# Patient Record
Sex: MALE | Race: OTHER | HISPANIC OR LATINO | URBAN - METROPOLITAN AREA
[De-identification: names, ages, dates, MRNs, and addresses within clinical notes are randomized per-mention and may not be internally consistent; named-entity substitution may affect disease eponyms.]

---

## 2020-03-26 ENCOUNTER — INPATIENT (INPATIENT)
Facility: HOSPITAL | Age: 55
LOS: 0 days | Discharge: HOME | DRG: 195 | End: 2020-03-27
Attending: INTERNAL MEDICINE | Admitting: INTERNAL MEDICINE
Payer: COMMERCIAL

## 2020-03-26 VITALS
HEIGHT: 70 IN | WEIGHT: 240.08 LBS | OXYGEN SATURATION: 96 % | TEMPERATURE: 98 F | HEART RATE: 77 BPM | DIASTOLIC BLOOD PRESSURE: 86 MMHG | SYSTOLIC BLOOD PRESSURE: 147 MMHG | RESPIRATION RATE: 20 BRPM

## 2020-03-26 DIAGNOSIS — R07.9 CHEST PAIN, UNSPECIFIED: ICD-10-CM

## 2020-03-26 LAB
ALBUMIN SERPL ELPH-MCNC: 3.5 G/DL — SIGNIFICANT CHANGE UP (ref 3.5–5)
ALP SERPL-CCNC: 119 U/L — SIGNIFICANT CHANGE UP (ref 40–120)
ALT FLD-CCNC: 92 U/L DA — HIGH (ref 10–60)
ANION GAP SERPL CALC-SCNC: 6 MMOL/L — SIGNIFICANT CHANGE UP (ref 5–17)
AST SERPL-CCNC: 52 U/L — HIGH (ref 10–40)
BASOPHILS # BLD AUTO: 0 K/UL — SIGNIFICANT CHANGE UP (ref 0–0.2)
BASOPHILS NFR BLD AUTO: 0 % — SIGNIFICANT CHANGE UP (ref 0–2)
BILIRUB SERPL-MCNC: 0.6 MG/DL — SIGNIFICANT CHANGE UP (ref 0.2–1.2)
BUN SERPL-MCNC: 12 MG/DL — SIGNIFICANT CHANGE UP (ref 7–18)
CALCIUM SERPL-MCNC: 8 MG/DL — LOW (ref 8.4–10.5)
CHLORIDE SERPL-SCNC: 108 MMOL/L — SIGNIFICANT CHANGE UP (ref 96–108)
CO2 SERPL-SCNC: 25 MMOL/L — SIGNIFICANT CHANGE UP (ref 22–31)
CREAT SERPL-MCNC: 1.05 MG/DL — SIGNIFICANT CHANGE UP (ref 0.5–1.3)
EOSINOPHIL # BLD AUTO: 0 K/UL — SIGNIFICANT CHANGE UP (ref 0–0.5)
EOSINOPHIL NFR BLD AUTO: 0 % — SIGNIFICANT CHANGE UP (ref 0–6)
GLUCOSE SERPL-MCNC: 93 MG/DL — SIGNIFICANT CHANGE UP (ref 70–99)
HCT VFR BLD CALC: 44.3 % — SIGNIFICANT CHANGE UP (ref 39–50)
HGB BLD-MCNC: 15.4 G/DL — SIGNIFICANT CHANGE UP (ref 13–17)
LIDOCAIN IGE QN: 170 U/L — SIGNIFICANT CHANGE UP (ref 73–393)
LYMPHOCYTES # BLD AUTO: 2.32 K/UL — SIGNIFICANT CHANGE UP (ref 1–3.3)
LYMPHOCYTES # BLD AUTO: 32 % — SIGNIFICANT CHANGE UP (ref 13–44)
MCHC RBC-ENTMCNC: 28.7 PG — SIGNIFICANT CHANGE UP (ref 27–34)
MCHC RBC-ENTMCNC: 34.8 GM/DL — SIGNIFICANT CHANGE UP (ref 32–36)
MCV RBC AUTO: 82.6 FL — SIGNIFICANT CHANGE UP (ref 80–100)
MONOCYTES # BLD AUTO: 0.73 K/UL — SIGNIFICANT CHANGE UP (ref 0–0.9)
MONOCYTES NFR BLD AUTO: 10 % — SIGNIFICANT CHANGE UP (ref 2–14)
NEUTROPHILS # BLD AUTO: 4.07 K/UL — SIGNIFICANT CHANGE UP (ref 1.8–7.4)
NEUTROPHILS NFR BLD AUTO: 56 % — SIGNIFICANT CHANGE UP (ref 43–77)
PLATELET # BLD AUTO: 89 K/UL — LOW (ref 150–400)
POTASSIUM SERPL-MCNC: 3.5 MMOL/L — SIGNIFICANT CHANGE UP (ref 3.5–5.3)
POTASSIUM SERPL-SCNC: 3.5 MMOL/L — SIGNIFICANT CHANGE UP (ref 3.5–5.3)
PROT SERPL-MCNC: 7.5 G/DL — SIGNIFICANT CHANGE UP (ref 6–8.3)
RBC # BLD: 5.36 M/UL — SIGNIFICANT CHANGE UP (ref 4.2–5.8)
RBC # FLD: 13.2 % — SIGNIFICANT CHANGE UP (ref 10.3–14.5)
SODIUM SERPL-SCNC: 139 MMOL/L — SIGNIFICANT CHANGE UP (ref 135–145)
TROPONIN I SERPL-MCNC: 0.03 NG/ML — SIGNIFICANT CHANGE UP (ref 0–0.04)
TROPONIN I SERPL-MCNC: 0.03 NG/ML — SIGNIFICANT CHANGE UP (ref 0–0.04)
WBC # BLD: 7.26 K/UL — SIGNIFICANT CHANGE UP (ref 3.8–10.5)
WBC # FLD AUTO: 7.26 K/UL — SIGNIFICANT CHANGE UP (ref 3.8–10.5)

## 2020-03-26 PROCEDURE — 99223 1ST HOSP IP/OBS HIGH 75: CPT

## 2020-03-26 PROCEDURE — 71045 X-RAY EXAM CHEST 1 VIEW: CPT | Mod: 26

## 2020-03-26 PROCEDURE — 99285 EMERGENCY DEPT VISIT HI MDM: CPT

## 2020-03-26 RX ORDER — SODIUM CHLORIDE 9 MG/ML
1000 INJECTION INTRAMUSCULAR; INTRAVENOUS; SUBCUTANEOUS ONCE
Refills: 0 | Status: COMPLETED | OUTPATIENT
Start: 2020-03-26 | End: 2020-03-26

## 2020-03-26 RX ORDER — OXYCODONE HYDROCHLORIDE 5 MG/1
5 TABLET ORAL EVERY 8 HOURS
Refills: 0 | Status: DISCONTINUED | OUTPATIENT
Start: 2020-03-26 | End: 2020-03-27

## 2020-03-26 RX ADMIN — Medication 100 MILLIGRAM(S): at 17:12

## 2020-03-26 RX ADMIN — SODIUM CHLORIDE 1000 MILLILITER(S): 9 INJECTION INTRAMUSCULAR; INTRAVENOUS; SUBCUTANEOUS at 17:12

## 2020-03-26 NOTE — ED PROVIDER NOTE - PROGRESS NOTE DETAILS
Troponin 0.030 trended to 0.032. Concern for underlying cardiac insult. Will admit to trend troponin.

## 2020-03-26 NOTE — H&P ADULT - PROBLEM/PLAN-1
Complex Repair And Graft Additional Text (Will Appearing After The Standard Complex Repair Text): The complex repair was not sufficient to completely close the primary defect. The remaining additional defect was repaired with the graft mentioned below. DISPLAY PLAN FREE TEXT

## 2020-03-26 NOTE — ED ADULT NURSE NOTE - OBJECTIVE STATEMENT
Pt AOx4, ambulatory, c/o Worsening diff to breathe, CP, cough. Pt has positive COVID test x 2 days. EKG done, pt placed on cardiac monitor, no signs of distress noted.

## 2020-03-26 NOTE — H&P ADULT - PROBLEM SELECTOR PLAN 2
Pt has midsternal chest pain   EKG shows Normal sinus rhythm with PVCs  Troponin T1: 0.030  F/u serial cardiac enzymes  Continue aspirin , statin

## 2020-03-26 NOTE — ED PROVIDER NOTE - OBJECTIVE STATEMENT
55 year old male with PMHx of hypertension and hyperlipidemia and no pertinent PSHx presents to the ED with complaints of chest pain and a cough. Patient reports that he tested positive for COVID-19 yesterday, and that he has been experiencing URI symptoms including cough and mild congestion over the past week. Patient states that over the last 24 hours he has developed some chest pressure, mild diaphoresis, and an increase in coughing. Patient otherwise denies any shortness of breath, nausea, vomiting, lower extremity swelling, orthopnea, and all other acute complaints. NKDA.

## 2020-03-26 NOTE — H&P ADULT - PROBLEM SELECTOR PLAN 5
IMPROVE VTE Individual Risk Assessment   RISK                                                          Points  [  ] Previous VTE                                                3  [  ] Thrombophilia                                             2  [  ] Lower limb paralysis                                    2        (unable to hold up >15 seconds)    [  ] Current Cancer                                             2         (within 6 months)  [  x] Immobilization > 24 hrs                              1  [  ] ICU/CCU stay > 24 hours                            1  [ ] Age > 60                                                    1  IMPROVE VTE Score _________1  Lovenox for DVT prophylaxis

## 2020-03-26 NOTE — H&P ADULT - ASSESSMENT
Pt is a 54 y/o M with PMH of HTN, HLD, COVID-19 diagnosed recently,  who presented with cough along with chest pain  since 3 days and shortness of breath since 1 day.  Pt is being admitted for multifocal pneumonia likely secondary to viral pneumonia. Known COVID-19

## 2020-03-26 NOTE — H&P ADULT - ATTENDING COMMENTS
Vital Signs Last 24 Hrs  T(C): 36.7 (26 Mar 2020 18:53), Max: 36.8 (26 Mar 2020 16:29)  T(F): 98 (26 Mar 2020 18:53), Max: 98.3 (26 Mar 2020 16:29)  HR: 85 (26 Mar 2020 18:53) (77 - 85)  BP: 135/81 (26 Mar 2020 18:53) (135/81 - 147/86)  BP(mean): --  RR: 20 (26 Mar 2020 18:53) (20 - 20)  SpO2: 97% (26 Mar 2020 18:53) (96% - 97%) Patient seen and examined ; case was discussed with the admitting resident    ROS: as in the HPI; all other ROS negative    SH and family history as above    Vital Signs Last 24 Hrs  T(C): 37.2 (27 Mar 2020 02:08), Max: 37.2 (27 Mar 2020 02:08)  T(F): 99 (27 Mar 2020 02:08), Max: 99 (27 Mar 2020 02:08)  HR: 92 (27 Mar 2020 02:08) (77 - 92)  BP: 138/81 (27 Mar 2020 02:08) (135/81 - 147/86)  BP(mean): --  RR: 20 (27 Mar 2020 02:08) (20 - 20)  SpO2: 96% (27 Mar 2020 02:08) (96% - 97%)    GEN: NAD  HEENT- normocephalic; mouth moist  CVS- S1S2+, pain not reproducible.   LUNGS- decreased breath sounds   ABD: Soft , nontender, nondistended, Bowel sounds are present  EXTREMITY: no calf tenderness, no cyanosis, no edema  NEURO: AAOx3; non focal neurologic exam; cranial nerves grossly intact  PSYCH: normal affect and behavior  BACK: no swelling or mass;   VASCULAR: ++ distal peripheral pulses  SKIN: warm and dry.       Labs Reviewed:                         15.4   7.26  )-----------( 89       ( 26 Mar 2020 17:12 )             44.3     03-26    139  |  108  |  12  ----------------------------<  93  3.5   |  25  |  1.05    Ca    8.0<L>      26 Mar 2020 17:12    TPro  7.5  /  Alb  3.5  /  TBili  0.6  /  DBili  x   /  AST  52<H>  /  ALT  92<H>  /  AlkPhos  119  03-26    CARDIAC MARKERS ( 27 Mar 2020 00:39 )  0.032 ng/mL / x     / 222 U/L / x     / <1.0 ng/mL  CARDIAC MARKERS ( 26 Mar 2020 18:31 )  0.032 ng/mL / x     / x     / x     / x      CARDIAC MARKERS ( 26 Mar 2020 17:12 )  0.030 ng/mL / x     / x     / x     / x              BNP:   MEDICATIONS  (STANDING):  amLODIPine   Tablet 5 milliGRAM(s) Oral daily  atorvastatin 40 milliGRAM(s) Oral at bedtime  azithromycin   Tablet 500 milliGRAM(s) Oral daily  enoxaparin Injectable 40 milliGRAM(s) SubCutaneous daily  metoprolol succinate ER 25 milliGRAM(s) Oral daily    MEDICATIONS  (PRN):  oxyCODONE    IR 5 milliGRAM(s) Oral every 8 hours PRN Severe Pain (7 - 10)      CXR reviewed  EKG Reviewed        56 y/o M with HTN, HLD admitted with CAP and atypical chest pain. Patient had chest pain with coughing for the last several days and then persistent chest pain prompting his ED visit. Had LHC that was negative 15 years ago, then had echo and CT with contrast ?coronary CT that was negative per his report on 3/3/20 at [facility].    1. CAP- will do macrolide monotherapy, patient not meeting sepsis criteria and no documented hypoxia. Wean off O2  2. Atypical chest pain- suspect pleurisy in setting of pna, repeat troponin x1, tele overnight, would defer echo given recent echo performed that he reports is normal. Appreciate cardiology consultation   3. HTN  4. HLD         Plan of care discussed with patient ;  all questions and concerns were addressed.  Discussed with Patient's family Patient seen and examined ; case was discussed with the admitting resident    ROS: as in the HPI; all other ROS negative    SH and family history as above    Vital Signs Last 24 Hrs  T(C): 37.2 (27 Mar 2020 02:08), Max: 37.2 (27 Mar 2020 02:08)  T(F): 99 (27 Mar 2020 02:08), Max: 99 (27 Mar 2020 02:08)  HR: 92 (27 Mar 2020 02:08) (77 - 92)  BP: 138/81 (27 Mar 2020 02:08) (135/81 - 147/86)  BP(mean): --  RR: 20 (27 Mar 2020 02:08) (20 - 20)  SpO2: 96% (27 Mar 2020 02:08) (96% - 97%)    GEN: NAD  HEENT- normocephalic; mouth moist  CVS- S1S2+, pain not reproducible.   LUNGS- decreased breath sounds   ABD: Soft , nontender, nondistended, Bowel sounds are present  EXTREMITY: no calf tenderness, no cyanosis, no edema  NEURO: AAOx3; non focal neurologic exam; cranial nerves grossly intact  PSYCH: normal affect and behavior  BACK: no swelling or mass;   VASCULAR: ++ distal peripheral pulses  SKIN: warm and dry.       Labs Reviewed:                         15.4   7.26  )-----------( 89       ( 26 Mar 2020 17:12 )             44.3     03-26    139  |  108  |  12  ----------------------------<  93  3.5   |  25  |  1.05    Ca    8.0<L>      26 Mar 2020 17:12    TPro  7.5  /  Alb  3.5  /  TBili  0.6  /  DBili  x   /  AST  52<H>  /  ALT  92<H>  /  AlkPhos  119  03-26    CARDIAC MARKERS ( 27 Mar 2020 00:39 )  0.032 ng/mL / x     / 222 U/L / x     / <1.0 ng/mL  CARDIAC MARKERS ( 26 Mar 2020 18:31 )  0.032 ng/mL / x     / x     / x     / x      CARDIAC MARKERS ( 26 Mar 2020 17:12 )  0.030 ng/mL / x     / x     / x     / x              BNP:   MEDICATIONS  (STANDING):  amLODIPine   Tablet 5 milliGRAM(s) Oral daily  atorvastatin 40 milliGRAM(s) Oral at bedtime  azithromycin   Tablet 500 milliGRAM(s) Oral daily  enoxaparin Injectable 40 milliGRAM(s) SubCutaneous daily  metoprolol succinate ER 25 milliGRAM(s) Oral daily    MEDICATIONS  (PRN):  oxyCODONE    IR 5 milliGRAM(s) Oral every 8 hours PRN Severe Pain (7 - 10)      CXR reviewed  EKG Reviewed        54 y/o M with HTN, HLD admitted with CAP and atypical chest pain. Patient had chest pain with coughing for the last several days and then persistent chest pain prompting his ED visit. Had LHC that was negative 15 years ago, then had echo and CT with contrast ?coronary CT that was negative per his report on 3/3/20 at [place].    1. CAP- will do macrolide monotherapy, patient not meeting sepsis criteria and no documented hypoxia. Wean off O2  2. Atypical chest pain- suspect pleurisy in setting of pna, repeat troponin x1, tele overnight, would defer echo given recent echo performed that he reports is normal. Appreciate cardiology consultation   3. HTN  4. HLD   5. Thrombocytopenia- ?due to sepsis, no overt bleeding, patient deferred HIV screen had recent negative with his job, check hepatitis panel       Plan of care discussed with patient ;  all questions and concerns were addressed.

## 2020-03-26 NOTE — H&P ADULT - NSHPLABSRESULTS_GEN_ALL_CORE
15.4   7.26  )-----------( 89       ( 26 Mar 2020 17:12 )             44.3       03-26    139  |  108  |  12  ----------------------------<  93  3.5   |  25  |  1.05    Ca    8.0<L>      26 Mar 2020 17:12    TPro  7.5  /  Alb  3.5  /  TBili  0.6  /  DBili  x   /  AST  52<H>  /  ALT  92<H>  /  AlkPhos  119  03-26

## 2020-03-26 NOTE — ED ADULT NURSE NOTE - NSIMPLEMENTINTERV_GEN_ALL_ED
Implemented All Universal Safety Interventions:  [place] to call system. Call bell, personal items and telephone within reach. Instruct patient to call for assistance. Room bathroom lighting operational. Non-slip footwear when patient is off stretcher. Physically safe environment: no spills, clutter or unnecessary equipment. Stretcher in lowest position, wheels locked, appropriate side rails in place.

## 2020-03-26 NOTE — H&P ADULT - HISTORY OF PRESENT ILLNESS
Pt is a 54 y/o M with PMH of HTN, HLD who presented with cough along with chest pain  since 3 days and shortness of breath since 1 day. According to the pt, he has been experiencing dry cough since 3 days which is getting worse. He has midsternal chest pain of about 5/10 intensity without radiation, which is worse on coughing. He started to develop shortness of breath since to Pt is a 56 y/o M with PMH of HTN, HLD, COVID-19 diagnosed recently,  who presented with cough along with chest pain  since 3 days and shortness of breath since 1 day. According to the pt, he has been experiencing dry cough with scanty amount of sputum  since 3 days which is getting worse. He has midsternal chest pain of about 5/10 intensity without radiation, which is worse on coughing. He started to develop shortness of breath since today which is worse on exertion. He mentioned that he has palpitation while coughing. Denies fever, nausea, vomiting, abdominal pain , recent travel and other complains. He works at hotel. His wife was diagnosed COVID-19 and son is sick at home.

## 2020-03-26 NOTE — H&P ADULT - PROBLEM SELECTOR PLAN 1
Pt saturating about 96% on room air  Chest x-ray showed bilateral infiltrate  Likely viral pneumonia secondary to  COVID-19  Isolation precaution  F/u RVP  F/u serum ferritin, LDH, CRP

## 2020-03-26 NOTE — H&P ADULT - NSHPSOCIALHISTORY_GEN_ALL_CORE
Pt works at [employer]. He is a former smoker. He used to smoke occasionally about 30 years back. Drinks beer occasionally.

## 2020-03-26 NOTE — H&P ADULT - NEUROLOGICAL DETAILS
responds to verbal commands/alert and oriented x 3/responds to pain/deep reflexes intact/sensation intact/cranial nerves intact

## 2020-03-26 NOTE — H&P ADULT - NEGATIVE OPHTHALMOLOGIC SYMPTOMS
no lacrimation R/no blurred vision L/no lacrimation L/no diplopia/no photophobia/no blurred vision R

## 2020-03-27 ENCOUNTER — TRANSCRIPTION ENCOUNTER (OUTPATIENT)
Age: 55
End: 2020-03-27

## 2020-03-27 VITALS
DIASTOLIC BLOOD PRESSURE: 70 MMHG | RESPIRATION RATE: 17 BRPM | TEMPERATURE: 99 F | HEART RATE: 73 BPM | OXYGEN SATURATION: 95 % | SYSTOLIC BLOOD PRESSURE: 141 MMHG

## 2020-03-27 DIAGNOSIS — R07.9 CHEST PAIN, UNSPECIFIED: ICD-10-CM

## 2020-03-27 DIAGNOSIS — J18.9 PNEUMONIA, UNSPECIFIED ORGANISM: ICD-10-CM

## 2020-03-27 DIAGNOSIS — E78.5 HYPERLIPIDEMIA, UNSPECIFIED: ICD-10-CM

## 2020-03-27 DIAGNOSIS — I10 ESSENTIAL (PRIMARY) HYPERTENSION: ICD-10-CM

## 2020-03-27 DIAGNOSIS — Z29.9 ENCOUNTER FOR PROPHYLACTIC MEASURES, UNSPECIFIED: ICD-10-CM

## 2020-03-27 LAB
24R-OH-CALCIDIOL SERPL-MCNC: 27.9 NG/ML — LOW (ref 30–80)
ALBUMIN SERPL ELPH-MCNC: 3.4 G/DL — LOW (ref 3.5–5)
ALP SERPL-CCNC: 99 U/L — SIGNIFICANT CHANGE UP (ref 40–120)
ALT FLD-CCNC: 81 U/L DA — HIGH (ref 10–60)
ANION GAP SERPL CALC-SCNC: 7 MMOL/L — SIGNIFICANT CHANGE UP (ref 5–17)
AST SERPL-CCNC: 50 U/L — HIGH (ref 10–40)
BILIRUB SERPL-MCNC: 0.6 MG/DL — SIGNIFICANT CHANGE UP (ref 0.2–1.2)
BUN SERPL-MCNC: 12 MG/DL — SIGNIFICANT CHANGE UP (ref 7–18)
CALCIUM SERPL-MCNC: 8.3 MG/DL — LOW (ref 8.4–10.5)
CHLORIDE SERPL-SCNC: 105 MMOL/L — SIGNIFICANT CHANGE UP (ref 96–108)
CHOLEST SERPL-MCNC: 74 MG/DL — SIGNIFICANT CHANGE UP (ref 10–199)
CK MB BLD-MCNC: <0.5 % — SIGNIFICANT CHANGE UP (ref 0–3.5)
CK MB CFR SERPL CALC: <1 NG/ML — SIGNIFICANT CHANGE UP (ref 0–3.6)
CK SERPL-CCNC: 222 U/L — SIGNIFICANT CHANGE UP (ref 35–232)
CO2 SERPL-SCNC: 25 MMOL/L — SIGNIFICANT CHANGE UP (ref 22–31)
CREAT SERPL-MCNC: 1.08 MG/DL — SIGNIFICANT CHANGE UP (ref 0.5–1.3)
CRP SERPL-MCNC: 3.39 MG/DL — HIGH (ref 0–0.4)
FERRITIN SERPL-MCNC: 454 NG/ML — HIGH (ref 30–400)
FLU A RESULT: SIGNIFICANT CHANGE UP
FLU A RESULT: SIGNIFICANT CHANGE UP
FLUAV AG NPH QL: SIGNIFICANT CHANGE UP
FLUBV AG NPH QL: SIGNIFICANT CHANGE UP
GLUCOSE SERPL-MCNC: 92 MG/DL — SIGNIFICANT CHANGE UP (ref 70–99)
HAV IGM SER-ACNC: SIGNIFICANT CHANGE UP
HBA1C BLD-MCNC: 5.6 % — SIGNIFICANT CHANGE UP (ref 4–5.6)
HBV CORE IGM SER-ACNC: SIGNIFICANT CHANGE UP
HBV SURFACE AG SER-ACNC: SIGNIFICANT CHANGE UP
HCT VFR BLD CALC: 41.8 % — SIGNIFICANT CHANGE UP (ref 39–50)
HCV AB S/CO SERPL IA: 0.27 S/CO — SIGNIFICANT CHANGE UP (ref 0–0.99)
HCV AB S/CO SERPL IA: 0.28 S/CO — SIGNIFICANT CHANGE UP (ref 0–0.99)
HCV AB SERPL-IMP: SIGNIFICANT CHANGE UP
HCV AB SERPL-IMP: SIGNIFICANT CHANGE UP
HDLC SERPL-MCNC: 18 MG/DL — LOW
HGB BLD-MCNC: 14.3 G/DL — SIGNIFICANT CHANGE UP (ref 13–17)
LDH SERPL L TO P-CCNC: 280 U/L — HIGH (ref 120–225)
LIPID PNL WITH DIRECT LDL SERPL: 33 MG/DL — SIGNIFICANT CHANGE UP
MAGNESIUM SERPL-MCNC: 2 MG/DL — SIGNIFICANT CHANGE UP (ref 1.6–2.6)
MCHC RBC-ENTMCNC: 28.4 PG — SIGNIFICANT CHANGE UP (ref 27–34)
MCHC RBC-ENTMCNC: 34.2 GM/DL — SIGNIFICANT CHANGE UP (ref 32–36)
MCV RBC AUTO: 82.9 FL — SIGNIFICANT CHANGE UP (ref 80–100)
NRBC # BLD: 0 /100 WBCS — SIGNIFICANT CHANGE UP (ref 0–0)
PHOSPHATE SERPL-MCNC: 2.8 MG/DL — SIGNIFICANT CHANGE UP (ref 2.5–4.5)
PLATELET # BLD AUTO: 94 K/UL — LOW (ref 150–400)
POTASSIUM SERPL-MCNC: 3.9 MMOL/L — SIGNIFICANT CHANGE UP (ref 3.5–5.3)
POTASSIUM SERPL-SCNC: 3.9 MMOL/L — SIGNIFICANT CHANGE UP (ref 3.5–5.3)
PROT SERPL-MCNC: 7.2 G/DL — SIGNIFICANT CHANGE UP (ref 6–8.3)
RBC # BLD: 5.04 M/UL — SIGNIFICANT CHANGE UP (ref 4.2–5.8)
RBC # FLD: 13.1 % — SIGNIFICANT CHANGE UP (ref 10.3–14.5)
RSV RESULT: SIGNIFICANT CHANGE UP
RSV RNA RESP QL NAA+PROBE: SIGNIFICANT CHANGE UP
SODIUM SERPL-SCNC: 137 MMOL/L — SIGNIFICANT CHANGE UP (ref 135–145)
TOTAL CHOLESTEROL/HDL RATIO MEASUREMENT: 4.1 RATIO — SIGNIFICANT CHANGE UP (ref 3.4–9.6)
TRIGL SERPL-MCNC: 114 MG/DL — SIGNIFICANT CHANGE UP (ref 10–149)
TROPONIN I SERPL-MCNC: 0.03 NG/ML — SIGNIFICANT CHANGE UP (ref 0–0.04)
TSH SERPL-MCNC: 1.19 UU/ML — SIGNIFICANT CHANGE UP (ref 0.34–4.82)
VIT B12 SERPL-MCNC: 444 PG/ML — SIGNIFICANT CHANGE UP (ref 232–1245)
WBC # BLD: 8.1 K/UL — SIGNIFICANT CHANGE UP (ref 3.8–10.5)
WBC # FLD AUTO: 8.1 K/UL — SIGNIFICANT CHANGE UP (ref 3.8–10.5)

## 2020-03-27 PROCEDURE — 84484 ASSAY OF TROPONIN QUANT: CPT

## 2020-03-27 PROCEDURE — 87581 M.PNEUMON DNA AMP PROBE: CPT

## 2020-03-27 PROCEDURE — 85027 COMPLETE CBC AUTOMATED: CPT

## 2020-03-27 PROCEDURE — 99285 EMERGENCY DEPT VISIT HI MDM: CPT

## 2020-03-27 PROCEDURE — 84100 ASSAY OF PHOSPHORUS: CPT

## 2020-03-27 PROCEDURE — 93005 ELECTROCARDIOGRAM TRACING: CPT

## 2020-03-27 PROCEDURE — 82607 VITAMIN B-12: CPT

## 2020-03-27 PROCEDURE — 83690 ASSAY OF LIPASE: CPT

## 2020-03-27 PROCEDURE — 82553 CREATINE MB FRACTION: CPT

## 2020-03-27 PROCEDURE — 83036 HEMOGLOBIN GLYCOSYLATED A1C: CPT

## 2020-03-27 PROCEDURE — 87633 RESP VIRUS 12-25 TARGETS: CPT

## 2020-03-27 PROCEDURE — 36415 COLL VENOUS BLD VENIPUNCTURE: CPT

## 2020-03-27 PROCEDURE — 82550 ASSAY OF CK (CPK): CPT

## 2020-03-27 PROCEDURE — 80074 ACUTE HEPATITIS PANEL: CPT

## 2020-03-27 PROCEDURE — 86140 C-REACTIVE PROTEIN: CPT

## 2020-03-27 PROCEDURE — 80053 COMPREHEN METABOLIC PANEL: CPT

## 2020-03-27 PROCEDURE — 83735 ASSAY OF MAGNESIUM: CPT

## 2020-03-27 PROCEDURE — 83615 LACTATE (LD) (LDH) ENZYME: CPT

## 2020-03-27 PROCEDURE — 80061 LIPID PANEL: CPT

## 2020-03-27 PROCEDURE — 87798 DETECT AGENT NOS DNA AMP: CPT

## 2020-03-27 PROCEDURE — 86803 HEPATITIS C AB TEST: CPT

## 2020-03-27 PROCEDURE — 82306 VITAMIN D 25 HYDROXY: CPT

## 2020-03-27 PROCEDURE — 87486 CHLMYD PNEUM DNA AMP PROBE: CPT

## 2020-03-27 PROCEDURE — 71045 X-RAY EXAM CHEST 1 VIEW: CPT

## 2020-03-27 PROCEDURE — 82728 ASSAY OF FERRITIN: CPT

## 2020-03-27 PROCEDURE — 87631 RESP VIRUS 3-5 TARGETS: CPT

## 2020-03-27 PROCEDURE — 84443 ASSAY THYROID STIM HORMONE: CPT

## 2020-03-27 RX ORDER — ACETAMINOPHEN 500 MG
2 TABLET ORAL
Qty: 0 | Refills: 0 | DISCHARGE
Start: 2020-03-27

## 2020-03-27 RX ORDER — ATORVASTATIN CALCIUM 80 MG/1
1 TABLET, FILM COATED ORAL
Qty: 0 | Refills: 0 | DISCHARGE
Start: 2020-03-27

## 2020-03-27 RX ORDER — METOPROLOL TARTRATE 50 MG
1 TABLET ORAL
Qty: 0 | Refills: 0 | DISCHARGE

## 2020-03-27 RX ORDER — AZITHROMYCIN 500 MG/1
1 TABLET, FILM COATED ORAL
Qty: 4 | Refills: 0
Start: 2020-03-27 | End: 2020-03-30

## 2020-03-27 RX ORDER — ACETAMINOPHEN 500 MG
650 TABLET ORAL EVERY 6 HOURS
Refills: 0 | Status: DISCONTINUED | OUTPATIENT
Start: 2020-03-27 | End: 2020-03-27

## 2020-03-27 RX ORDER — AZITHROMYCIN 500 MG/1
500 TABLET, FILM COATED ORAL DAILY
Refills: 0 | Status: DISCONTINUED | OUTPATIENT
Start: 2020-03-27 | End: 2020-03-27

## 2020-03-27 RX ORDER — AMLODIPINE BESYLATE 2.5 MG/1
5 TABLET ORAL DAILY
Refills: 0 | Status: DISCONTINUED | OUTPATIENT
Start: 2020-03-27 | End: 2020-03-27

## 2020-03-27 RX ORDER — ENOXAPARIN SODIUM 100 MG/ML
40 INJECTION SUBCUTANEOUS DAILY
Refills: 0 | Status: DISCONTINUED | OUTPATIENT
Start: 2020-03-27 | End: 2020-03-27

## 2020-03-27 RX ORDER — INFLUENZA VIRUS VACCINE 15; 15; 15; 15 UG/.5ML; UG/.5ML; UG/.5ML; UG/.5ML
0.5 SUSPENSION INTRAMUSCULAR ONCE
Refills: 0 | Status: DISCONTINUED | OUTPATIENT
Start: 2020-03-27 | End: 2020-03-27

## 2020-03-27 RX ORDER — FENOFIBRATE,MICRONIZED 130 MG
1 CAPSULE ORAL
Qty: 0 | Refills: 0 | DISCHARGE

## 2020-03-27 RX ORDER — AMLODIPINE BESYLATE 2.5 MG/1
1 TABLET ORAL
Qty: 0 | Refills: 0 | DISCHARGE

## 2020-03-27 RX ORDER — ATORVASTATIN CALCIUM 80 MG/1
1 TABLET, FILM COATED ORAL
Qty: 0 | Refills: 0 | DISCHARGE

## 2020-03-27 RX ORDER — METOPROLOL TARTRATE 50 MG
25 TABLET ORAL DAILY
Refills: 0 | Status: DISCONTINUED | OUTPATIENT
Start: 2020-03-27 | End: 2020-03-27

## 2020-03-27 RX ORDER — ATORVASTATIN CALCIUM 80 MG/1
40 TABLET, FILM COATED ORAL AT BEDTIME
Refills: 0 | Status: DISCONTINUED | OUTPATIENT
Start: 2020-03-27 | End: 2020-03-27

## 2020-03-27 RX ADMIN — AMLODIPINE BESYLATE 5 MILLIGRAM(S): 2.5 TABLET ORAL at 07:08

## 2020-03-27 RX ADMIN — Medication 650 MILLIGRAM(S): at 09:40

## 2020-03-27 RX ADMIN — Medication 650 MILLIGRAM(S): at 10:30

## 2020-03-27 RX ADMIN — OXYCODONE HYDROCHLORIDE 5 MILLIGRAM(S): 5 TABLET ORAL at 00:46

## 2020-03-27 RX ADMIN — Medication 100 MILLIGRAM(S): at 17:51

## 2020-03-27 RX ADMIN — OXYCODONE HYDROCHLORIDE 5 MILLIGRAM(S): 5 TABLET ORAL at 03:44

## 2020-03-27 RX ADMIN — Medication 25 MILLIGRAM(S): at 07:08

## 2020-03-27 RX ADMIN — AZITHROMYCIN 500 MILLIGRAM(S): 500 TABLET, FILM COATED ORAL at 12:32

## 2020-03-27 RX ADMIN — Medication 100 MILLIGRAM(S): at 13:13

## 2020-03-27 NOTE — PROGRESS NOTE ADULT - PROBLEM SELECTOR PLAN 2
Pt has midsternal chest pain   EKG shows Normal sinus rhythm with PVCs  Troponin T1: 0.030  Continue aspirin , statin

## 2020-03-27 NOTE — PROGRESS NOTE ADULT - PROBLEM SELECTOR PLAN 1
Pt saturating about 96% on room air  Chest x-ray showed bilateral infiltrate  Likely viral pneumonia secondary to  COVID-19  Isolation precaution  RVP neg. D/C planning

## 2020-03-27 NOTE — DISCHARGE NOTE PROVIDER - HOSPITAL COURSE
Pt is a 56 y/o M with PMH of HTN, HLD, COVID-19 diagnosed recently,  who presented with cough along with chest pain  since 3 days and shortness of breath since 1 day. According to the pt, he has been experiencing dry cough with scanty amount of sputum  since 3 days which is getting worse. He has midsternal chest pain of about 5/10 intensity without radiation, which is worse on coughing. He started to develop shortness of breath since today which is worse on exertion. He mentioned that he has palpitation while coughing. Denies fever, nausea, vomiting, abdominal pain , recent travel and other complains. He works at [employer]. His wife was diagnosed COVID-19 and son is sick at home.         Patient was found to be Covid +ve, was placed on contact and airborne Isolation precautions.    was initially on Nasal canula 2L saturating 99% later he was stable on room air , saturating >955 on room air. his chest x ray showed             IMPRESSION:        Findings of bilateral lung opacities are suspicious for pneumonia.        Patient was managed symptomatically. Tylenol for fever.    zithromax to cover pnuemonia.        Given patient's improved clinical status and current hemodynamic stability, decision was made to discharge the patient.    Patient is stable for discharge per attending and is advised to follow up with PCP as outpatient    Please refer to patient's complete medical chart with documents for a full hospital course, for this is only a brief summary.

## 2020-03-27 NOTE — DISCHARGE NOTE PROVIDER - NSDCMRMEDTOKEN_GEN_ALL_CORE_FT
acetaminophen 325 mg oral tablet: 2 tab(s) orally every 6 hours, As needed, Temp greater or equal to 38C (100.4F), Moderate Pain (4 - 6)  amLODIPine 5 mg oral tablet: 1 tab(s) orally once a day  atorvastatin 40 mg oral tablet: 1 tab(s) orally once a day (at bedtime)  azithromycin 500 mg oral tablet: 1 tab(s) orally once a day  fenofibrate 160 mg oral tablet: 1 tab(s) orally once a day  metoprolol succinate 25 mg oral tablet, extended release: 1 tab(s) orally once a day

## 2020-03-27 NOTE — PROGRESS NOTE ADULT - SUBJECTIVE AND OBJECTIVE BOX
Patient is a 55y old  Male who presents with a chief complaint of Cough, shortness of breath, chest pain (27 Mar 2020 13:55)      SUBJECTIVE / OVERNIGHT EVENTS: No events over night.     T(C): 37.2 (03-27-20 @ 18:05), Max: 37.3 (03-27-20 @ 14:48)  HR: 73 (03-27-20 @ 18:05) (73 - 73)  BP: 141/70 (03-27-20 @ 18:05) (141/70 - 149/76)  RR: 17 (03-27-20 @ 18:05) (17 - 18)  SpO2: 95% (03-27-20 @ 18:05) (95% - 95%)    MEDICATIONS  (STANDING):  amLODIPine   Tablet 5 milliGRAM(s) Oral daily  atorvastatin 40 milliGRAM(s) Oral at bedtime  azithromycin   Tablet 500 milliGRAM(s) Oral daily  enoxaparin Injectable 40 milliGRAM(s) SubCutaneous daily  guaiFENesin   Syrup  (Sugar-Free) 100 milliGRAM(s) Oral every 6 hours  influenza   Vaccine 0.5 milliLiter(s) IntraMuscular once  metoprolol succinate ER 25 milliGRAM(s) Oral daily    MEDICATIONS  (PRN):  acetaminophen   Tablet .. 650 milliGRAM(s) Oral every 6 hours PRN Temp greater or equal to 38C (100.4F), Moderate Pain (4 - 6)  oxyCODONE    IR 5 milliGRAM(s) Oral every 8 hours PRN Severe Pain (7 - 10)    PHYSICAL EXAM:  GENERAL: NAD, well-developed  HEAD:  Atraumatic, Normocephalic  EYES: EOMI, conjunctiva and sclera clear  NECK: Supple, No JVD  CHEST/LUNG: Clear to auscultation bilaterally; No wheeze  HEART: Regular rate and rhythm; No murmurs, rubs, or gallops  ABDOMEN: Soft, Nontender, Nondistended; Bowel sounds present  EXTREMITIES:  2+ Peripheral Pulses, No clubbing, cyanosis, or edema  PSYCH: AAOx3  NEUROLOGY: non-focal  SKIN: No rashes or lesions                          14.3   8.10  )-----------( 94       ( 27 Mar 2020 07:22 )             41.8       CARDIAC MARKERS ( 27 Mar 2020 00:39 )  0.032 ng/mL / x     / 222 U/L / x     / <1.0 ng/mL  CARDIAC MARKERS ( 26 Mar 2020 18:31 )  0.032 ng/mL / x     / x     / x     / x      CARDIAC MARKERS ( 26 Mar 2020 17:12 )  0.030 ng/mL / x     / x     / x     / x          LIVER FUNCTIONS - ( 27 Mar 2020 07:22 )  Alb: 3.4 g/dL / Pro: 7.2 g/dL / ALK PHOS: 99 U/L / ALT: 81 U/L DA / AST: 50 U/L / GGT: x             137|105|12<92  3.9|25|1.08  8.3,2.0,2.8  03-27 @ 07:22        RADIOLOGY & ADDITIONAL TESTS:    Imaging Personally Reviewed:    Consultant(s) Notes Reviewed:      Care Discussed with Consultants/Other Providers:

## 2020-03-27 NOTE — CHART NOTE - NSCHARTNOTEFT_GEN_A_CORE
Patient with positive COVID 19 from OSH, resulted positive today, with known positive contacts in family. Patient needs to be on airborne and contact for positive COVID19, will not reorder COVID19 swab for known positive. Discussed all of this with the nursing supervisor.

## 2020-03-29 LAB — RAPID RVP RESULT: SIGNIFICANT CHANGE UP

## 2021-01-08 ENCOUNTER — EMERGENCY (EMERGENCY)
Facility: HOSPITAL | Age: 56
LOS: 1 days | Discharge: HOME | End: 2021-01-08
Attending: EMERGENCY MEDICINE
Payer: COMMERCIAL

## 2021-01-08 VITALS
WEIGHT: 257.94 LBS | OXYGEN SATURATION: 97 % | HEART RATE: 76 BPM | SYSTOLIC BLOOD PRESSURE: 165 MMHG | DIASTOLIC BLOOD PRESSURE: 93 MMHG | HEIGHT: 70 IN | RESPIRATION RATE: 18 BRPM | TEMPERATURE: 98 F

## 2021-01-08 VITALS
RESPIRATION RATE: 17 BRPM | SYSTOLIC BLOOD PRESSURE: 150 MMHG | TEMPERATURE: 97 F | HEART RATE: 75 BPM | OXYGEN SATURATION: 98 % | DIASTOLIC BLOOD PRESSURE: 95 MMHG

## 2021-01-08 LAB
ALBUMIN SERPL ELPH-MCNC: 3.8 G/DL — SIGNIFICANT CHANGE UP (ref 3.5–5)
ALP SERPL-CCNC: 121 U/L — HIGH (ref 40–120)
ALT FLD-CCNC: 58 U/L DA — SIGNIFICANT CHANGE UP (ref 10–60)
ANION GAP SERPL CALC-SCNC: 8 MMOL/L — SIGNIFICANT CHANGE UP (ref 5–17)
AST SERPL-CCNC: 22 U/L — SIGNIFICANT CHANGE UP (ref 10–40)
BASOPHILS # BLD AUTO: 0.09 K/UL — SIGNIFICANT CHANGE UP (ref 0–0.2)
BASOPHILS NFR BLD AUTO: 0.9 % — SIGNIFICANT CHANGE UP (ref 0–2)
BILIRUB SERPL-MCNC: 0.6 MG/DL — SIGNIFICANT CHANGE UP (ref 0.2–1.2)
BUN SERPL-MCNC: 12 MG/DL — SIGNIFICANT CHANGE UP (ref 7–18)
CALCIUM SERPL-MCNC: 8.2 MG/DL — LOW (ref 8.4–10.5)
CHLORIDE SERPL-SCNC: 107 MMOL/L — SIGNIFICANT CHANGE UP (ref 96–108)
CO2 SERPL-SCNC: 28 MMOL/L — SIGNIFICANT CHANGE UP (ref 22–31)
CREAT SERPL-MCNC: 1.02 MG/DL — SIGNIFICANT CHANGE UP (ref 0.5–1.3)
EOSINOPHIL # BLD AUTO: 0.7 K/UL — HIGH (ref 0–0.5)
EOSINOPHIL NFR BLD AUTO: 7 % — HIGH (ref 0–6)
GLUCOSE SERPL-MCNC: 108 MG/DL — HIGH (ref 70–99)
HCT VFR BLD CALC: 43.5 % — SIGNIFICANT CHANGE UP (ref 39–50)
HGB BLD-MCNC: 14.5 G/DL — SIGNIFICANT CHANGE UP (ref 13–17)
IMM GRANULOCYTES NFR BLD AUTO: 0.9 % — SIGNIFICANT CHANGE UP (ref 0–1.5)
LIDOCAIN IGE QN: 139 U/L — SIGNIFICANT CHANGE UP (ref 73–393)
LYMPHOCYTES # BLD AUTO: 3.02 K/UL — SIGNIFICANT CHANGE UP (ref 1–3.3)
LYMPHOCYTES # BLD AUTO: 30.1 % — SIGNIFICANT CHANGE UP (ref 13–44)
MCHC RBC-ENTMCNC: 27.3 PG — SIGNIFICANT CHANGE UP (ref 27–34)
MCHC RBC-ENTMCNC: 33.3 GM/DL — SIGNIFICANT CHANGE UP (ref 32–36)
MCV RBC AUTO: 81.9 FL — SIGNIFICANT CHANGE UP (ref 80–100)
MONOCYTES # BLD AUTO: 0.91 K/UL — HIGH (ref 0–0.9)
MONOCYTES NFR BLD AUTO: 9.1 % — SIGNIFICANT CHANGE UP (ref 2–14)
NEUTROPHILS # BLD AUTO: 5.22 K/UL — SIGNIFICANT CHANGE UP (ref 1.8–7.4)
NEUTROPHILS NFR BLD AUTO: 52 % — SIGNIFICANT CHANGE UP (ref 43–77)
NRBC # BLD: 0 /100 WBCS — SIGNIFICANT CHANGE UP (ref 0–0)
PLATELET # BLD AUTO: 28 K/UL — LOW (ref 150–400)
POTASSIUM SERPL-MCNC: 3.5 MMOL/L — SIGNIFICANT CHANGE UP (ref 3.5–5.3)
POTASSIUM SERPL-SCNC: 3.5 MMOL/L — SIGNIFICANT CHANGE UP (ref 3.5–5.3)
PROT SERPL-MCNC: 7.4 G/DL — SIGNIFICANT CHANGE UP (ref 6–8.3)
RBC # BLD: 5.31 M/UL — SIGNIFICANT CHANGE UP (ref 4.2–5.8)
RBC # FLD: 14 % — SIGNIFICANT CHANGE UP (ref 10.3–14.5)
SODIUM SERPL-SCNC: 143 MMOL/L — SIGNIFICANT CHANGE UP (ref 135–145)
TROPONIN I SERPL-MCNC: 0.01 NG/ML — SIGNIFICANT CHANGE UP (ref 0–0.04)
TROPONIN I SERPL-MCNC: <0.015 NG/ML — SIGNIFICANT CHANGE UP (ref 0–0.04)
WBC # BLD: 10.05 K/UL — SIGNIFICANT CHANGE UP (ref 3.8–10.5)
WBC # FLD AUTO: 10.05 K/UL — SIGNIFICANT CHANGE UP (ref 3.8–10.5)

## 2021-01-08 PROCEDURE — 93005 ELECTROCARDIOGRAM TRACING: CPT

## 2021-01-08 PROCEDURE — 71045 X-RAY EXAM CHEST 1 VIEW: CPT | Mod: 26

## 2021-01-08 PROCEDURE — 83690 ASSAY OF LIPASE: CPT

## 2021-01-08 PROCEDURE — 80053 COMPREHEN METABOLIC PANEL: CPT

## 2021-01-08 PROCEDURE — 99283 EMERGENCY DEPT VISIT LOW MDM: CPT

## 2021-01-08 PROCEDURE — 99283 EMERGENCY DEPT VISIT LOW MDM: CPT | Mod: 25

## 2021-01-08 PROCEDURE — 85025 COMPLETE CBC W/AUTO DIFF WBC: CPT

## 2021-01-08 PROCEDURE — 93010 ELECTROCARDIOGRAM REPORT: CPT

## 2021-01-08 PROCEDURE — 36415 COLL VENOUS BLD VENIPUNCTURE: CPT

## 2021-01-08 PROCEDURE — 84484 ASSAY OF TROPONIN QUANT: CPT

## 2021-01-08 PROCEDURE — 71045 X-RAY EXAM CHEST 1 VIEW: CPT

## 2021-01-08 RX ORDER — SODIUM CHLORIDE 9 MG/ML
1000 INJECTION INTRAMUSCULAR; INTRAVENOUS; SUBCUTANEOUS ONCE
Refills: 0 | Status: COMPLETED | OUTPATIENT
Start: 2021-01-08 | End: 2021-01-08

## 2021-01-08 RX ADMIN — SODIUM CHLORIDE 1000 MILLILITER(S): 9 INJECTION INTRAMUSCULAR; INTRAVENOUS; SUBCUTANEOUS at 19:53

## 2021-01-08 NOTE — ED PROVIDER NOTE - OBJECTIVE STATEMENT
56 year old male presenting with PMHx of HTN, HLD, COVID-19 in March of 2020 presenting here with chest pain since this evening shortly prior to arrival. Patient reports that he arrived home after visiting his father when he began feeling a pressure sensation to the center of his chest. Pain is non-radiating and associated with diaphoresis. Patient took 3 baby aspirin and then presented to the ED. Since then, the pain has been significantly improved rated at a 1/10. Patient states that diaphoresis has since resolved as well. Denies shortness of breath, nausea, vomiting, or any other acute complaints.

## 2021-01-08 NOTE — ED PROVIDER NOTE - PROGRESS NOTE DETAILS
Serial troponins negative. All other labs unremarkable. Will dc with close PCP/Cards follow up. Patient in agreement with this plan.

## 2021-01-08 NOTE — ED PROVIDER NOTE - CHIEF COMPLAINT
The patient is a 56y Male complaining of chest pain.
The patient is a 56y Male complaining of chest pain.

## 2021-01-08 NOTE — ED PROVIDER NOTE - PATIENT PORTAL LINK FT
You can access the FollowMyHealth Patient Portal offered by [facility] by registering at the following website: [email or web address]. By joining [facility]’s FollowMyHealth portal, you will also be able to view your health information using other applications (apps) compatible with our system.

## 2021-01-08 NOTE — ED ADULT NURSE NOTE - OBJECTIVE STATEMENT
pt came in c/o chest pain and sweating since 18:30 , pt reports PMH of HTN , takes amlodipine, metoprolol   Pt is AOx3 , no acute distress, denies recent travel, was positive for covid in march of 2020 as per daughter

## 2021-01-09 PROBLEM — I10 ESSENTIAL (PRIMARY) HYPERTENSION: Chronic | Status: ACTIVE | Noted: 2020-03-26

## 2021-01-09 PROBLEM — E78.5 HYPERLIPIDEMIA, UNSPECIFIED: Chronic | Status: ACTIVE | Noted: 2020-03-26

## 2022-06-09 ENCOUNTER — EMERGENCY (EMERGENCY)
Facility: HOSPITAL | Age: 57
LOS: 1 days | Discharge: HOME | End: 2022-06-09
Attending: EMERGENCY MEDICINE
Payer: SELF-PAY

## 2022-06-09 VITALS
SYSTOLIC BLOOD PRESSURE: 140 MMHG | HEIGHT: 70 IN | HEART RATE: 72 BPM | TEMPERATURE: 98 F | DIASTOLIC BLOOD PRESSURE: 86 MMHG | RESPIRATION RATE: 18 BRPM | WEIGHT: 259.93 LBS | OXYGEN SATURATION: 98 %

## 2022-06-09 PROBLEM — U07.1 COVID-19: Chronic | Status: ACTIVE | Noted: 2021-01-11

## 2022-06-09 PROCEDURE — 96372 THER/PROPH/DIAG INJ SC/IM: CPT

## 2022-06-09 PROCEDURE — 72100 X-RAY EXAM L-S SPINE 2/3 VWS: CPT | Mod: 26

## 2022-06-09 PROCEDURE — 99283 EMERGENCY DEPT VISIT LOW MDM: CPT | Mod: 25

## 2022-06-09 PROCEDURE — 99284 EMERGENCY DEPT VISIT MOD MDM: CPT

## 2022-06-09 PROCEDURE — 72100 X-RAY EXAM L-S SPINE 2/3 VWS: CPT

## 2022-06-09 RX ORDER — KETOROLAC TROMETHAMINE 30 MG/ML
60 SYRINGE (ML) INJECTION ONCE
Refills: 0 | Status: DISCONTINUED | OUTPATIENT
Start: 2022-06-09 | End: 2022-06-09

## 2022-06-09 RX ORDER — DIAZEPAM 5 MG
1 TABLET ORAL
Qty: 10 | Refills: 0
Start: 2022-06-09

## 2022-06-09 RX ORDER — IBUPROFEN 200 MG
1 TABLET ORAL
Qty: 30 | Refills: 0
Start: 2022-06-09

## 2022-06-09 RX ADMIN — Medication 60 MILLIGRAM(S): at 15:24

## 2022-06-09 RX ADMIN — Medication 60 MILLIGRAM(S): at 14:54

## 2022-06-09 NOTE — ED PROVIDER NOTE - NEUROLOGICAL, MLM
Patient is ambulatory with stead gait. Alert and oriented, no focal deficits, no motor or sensory deficits.

## 2022-06-09 NOTE — ED PROVIDER NOTE - NSFOLLOWUPINSTRUCTIONS_ED_ALL_ED_FT
Acute Back Pain, Adult      Acute back pain is sudden and usually short-lived. It is often caused by an injury to the muscles and tissues in the back. The injury may result from:  •A muscle, tendon, or ligament getting overstretched or torn. Ligaments are tissues that connect bones to each other. Lifting something improperly can cause a back strain.      •Wear and tear (degeneration) of the spinal disks. Spinal disks are circular tissue that provide cushioning between the bones of the spine (vertebrae).      •Twisting motions, such as while playing sports or doing yard work.      •A hit to the back.      •Arthritis.      You may have a physical exam, lab tests, and imaging tests to find the cause of your pain. Acute back pain usually goes away with rest and home care.      Follow these instructions at home:    Managing pain, stiffness, and swelling     •Take over-the-counter and prescription medicines only as told by your health care provider. Treatment may include medicines for pain and inflammation that are taken by mouth or applied to the skin, or muscle relaxants.    •Your health care provider may recommend applying ice during the first 24–48 hours after your pain starts. To do this:  •Put ice in a plastic bag.      •Place a towel between your skin and the bag.      •Leave the ice on for 20 minutes, 2–3 times a day.      •Remove the ice if your skin turns bright red. This is very important. If you cannot feel pain, heat, or cold, you have a greater risk of damage to the area.      •If directed, apply heat to the affected area as often as told by your health care provider. Use the heat source that your health care provider recommends, such as a moist heat pack or a heating pad.  •Place a towel between your skin and the heat source.      •Leave the heat on for 20–30 minutes.      •Remove the heat if your skin turns bright red. This is especially important if you are unable to feel pain, heat, or cold. You have a greater risk of getting burned.          Activity   Comparisons of good and bad posture while driving, standing, sitting at a desk, and lifting heavy objects.   • Do not stay in bed. Staying in bed for more than 1–2 days can delay your recovery.    •Sit up and stand up straight. Avoid leaning forward when you sit or hunching over when you stand.  •If you work at a desk, sit close to it so you do not need to lean over. Keep your chin tucked in. Keep your neck drawn back, and keep your elbows bent at a 90-degree angle (right angle).      •Sit high and close to the steering wheel when you drive. Add lower back (lumbar) support to your car seat, if needed.        •Take short walks on even surfaces as soon as you are able. Try to increase the length of time you walk each day.      • Do not sit, drive, or  one place for more than 30 minutes at a time. Sitting or standing for long periods of time can put stress on your back.      • Do not drive or use heavy machinery while taking prescription pain medicine.    •Use proper lifting techniques. When you bend and lift, use positions that put less stress on your back:  •Bend your knees.      •Keep the load close to your body.      •Avoid twisting.        •Exercise regularly as told by your health care provider. Exercising helps your back heal faster and helps prevent back injuries by keeping muscles strong and flexible.      •Work with a physical therapist to make a safe exercise program, as recommended by your health care provider. Do any exercises as told by your physical therapist.      Lifestyle     •Maintain a healthy weight. Extra weight puts stress on your back and makes it difficult to have good posture.      •Avoid activities or situations that make you feel anxious or stressed. Stress and anxiety increase muscle tension and can make back pain worse. Learn ways to manage anxiety and stress, such as through exercise.      General instructions     •Sleep on a firm mattress in a comfortable position. Try lying on your side with your knees slightly bent. If you lie on your back, put a pillow under your knees.    •Keep your head and neck in a straight line with your spine (neutral position) when using electronic equipment like smartphones or pads. To do this:  •Raise your smartphone or pad to look at it instead of bending your head or neck to look down.      •Put the smartphone or pad at the level of your face while looking at the screen.      •Follow your treatment plan as told by your health care provider. This may include:  •Cognitive or behavioral therapy.      •Acupuncture or massage therapy.      •Meditation or yoga.          Contact a health care provider if:    •You have pain that is not relieved with rest or medicine.      •You have increasing pain going down into your legs or buttocks.      •Your pain does not improve after 2 weeks.      •You have pain at night.      •You lose weight without trying.      •You have a fever or chills.      •You develop nausea or vomiting.      •You develop abdominal pain.        Get help right away if:    •You develop new bowel or bladder control problems.      •You have unusual weakness or numbness in your arms or legs.      •You feel faint.      These symptoms may represent a serious problem that is an emergency. Do not wait to see if the symptoms will go away. Get medical help right away. Call your local emergency services (911 in the [place] Do not drive yourself to the hospital.       Summary    •Acute back pain is sudden and usually short-lived.      •Use proper lifting techniques. When you bend and lift, use positions that put less stress on your back.      •Take over-the-counter and prescription medicines only as told by your health care provider, and apply heat or ice as told.      This information is not intended to replace advice given to you by your health care provider. Make sure you discuss any questions you have with your health care provider.

## 2022-06-09 NOTE — ED PROVIDER NOTE - CLINICAL SUMMARY MEDICAL DECISION MAKING FREE TEXT BOX
X-ray was normal. Patient is driving home so won't give strong painkillers here but will send home with prescription.

## 2022-06-09 NOTE — ED ADULT NURSE NOTE - NSIMPLEMENTINTERV_GEN_ALL_ED
Implemented All Universal Safety Interventions:  [place] to call system. Call bell, personal items and telephone within reach. Instruct patient to call for assistance. Room bathroom lighting operational. Non-slip footwear when patient is off stretcher. Physically safe environment: no spills, clutter or unnecessary equipment. Stretcher in lowest position, wheels locked, appropriate side rails in place.

## 2022-06-09 NOTE — ED PROVIDER NOTE - OBJECTIVE STATEMENT
57 year old male with PMHx of HTN and HLD is presenting to the ED with chief complaint of back pain. Patient was lifting some boxes at work today when he bent down and suddenly felt a pop in the lower back. Since then, he has been having bad pain that radiates down both legs. Patient denies heavy boxes, numbness, tingling, and not taking anything for pain. NKDA

## 2022-06-09 NOTE — ED PROVIDER NOTE - PATIENT PORTAL LINK FT
You can access the FollowMyHealth Patient Portal offered by [facility] by registering at the following website: [email or web address]. By joining [employer]’s FollowMyHealth portal, you will also be able to view your health information using other applications (apps) compatible with our system.

## 2022-07-27 ENCOUNTER — EMERGENCY (EMERGENCY)
Facility: HOSPITAL | Age: 57
LOS: 1 days | Discharge: HOME | End: 2022-07-27
Attending: STUDENT IN AN ORGANIZED HEALTH CARE EDUCATION/TRAINING PROGRAM
Payer: COMMERCIAL

## 2022-07-27 VITALS
HEIGHT: 70 IN | RESPIRATION RATE: 16 BRPM | SYSTOLIC BLOOD PRESSURE: 157 MMHG | OXYGEN SATURATION: 96 % | HEART RATE: 79 BPM | TEMPERATURE: 100 F | WEIGHT: 264.55 LBS | DIASTOLIC BLOOD PRESSURE: 88 MMHG

## 2022-07-27 VITALS
SYSTOLIC BLOOD PRESSURE: 137 MMHG | RESPIRATION RATE: 16 BRPM | OXYGEN SATURATION: 96 % | DIASTOLIC BLOOD PRESSURE: 87 MMHG | HEART RATE: 66 BPM

## 2022-07-27 LAB
ANION GAP SERPL CALC-SCNC: 8 MMOL/L — SIGNIFICANT CHANGE UP (ref 5–17)
BASOPHILS # BLD AUTO: 0.07 K/UL — SIGNIFICANT CHANGE UP (ref 0–0.2)
BASOPHILS NFR BLD AUTO: 0.6 % — SIGNIFICANT CHANGE UP (ref 0–2)
BUN SERPL-MCNC: 11 MG/DL — SIGNIFICANT CHANGE UP (ref 7–18)
CALCIUM SERPL-MCNC: 8.7 MG/DL — SIGNIFICANT CHANGE UP (ref 8.4–10.5)
CHLORIDE SERPL-SCNC: 105 MMOL/L — SIGNIFICANT CHANGE UP (ref 96–108)
CO2 SERPL-SCNC: 27 MMOL/L — SIGNIFICANT CHANGE UP (ref 22–31)
CREAT SERPL-MCNC: 1 MG/DL — SIGNIFICANT CHANGE UP (ref 0.5–1.3)
EGFR: 87 ML/MIN/1.73M2 — SIGNIFICANT CHANGE UP
EOSINOPHIL # BLD AUTO: 0.94 K/UL — HIGH (ref 0–0.5)
EOSINOPHIL NFR BLD AUTO: 7.4 % — HIGH (ref 0–6)
GLUCOSE SERPL-MCNC: 117 MG/DL — HIGH (ref 70–99)
HCT VFR BLD CALC: 41.5 % — SIGNIFICANT CHANGE UP (ref 39–50)
HGB BLD-MCNC: 13.9 G/DL — SIGNIFICANT CHANGE UP (ref 13–17)
IMM GRANULOCYTES NFR BLD AUTO: 0.6 % — SIGNIFICANT CHANGE UP (ref 0–1.5)
LYMPHOCYTES # BLD AUTO: 24.4 % — SIGNIFICANT CHANGE UP (ref 13–44)
LYMPHOCYTES # BLD AUTO: 3.08 K/UL — SIGNIFICANT CHANGE UP (ref 1–3.3)
MCHC RBC-ENTMCNC: 28.3 PG — SIGNIFICANT CHANGE UP (ref 27–34)
MCHC RBC-ENTMCNC: 33.5 GM/DL — SIGNIFICANT CHANGE UP (ref 32–36)
MCV RBC AUTO: 84.3 FL — SIGNIFICANT CHANGE UP (ref 80–100)
MONOCYTES # BLD AUTO: 1.48 K/UL — HIGH (ref 0–0.9)
MONOCYTES NFR BLD AUTO: 11.7 % — SIGNIFICANT CHANGE UP (ref 2–14)
NEUTROPHILS # BLD AUTO: 6.98 K/UL — SIGNIFICANT CHANGE UP (ref 1.8–7.4)
NEUTROPHILS NFR BLD AUTO: 55.3 % — SIGNIFICANT CHANGE UP (ref 43–77)
NRBC # BLD: 0 /100 WBCS — SIGNIFICANT CHANGE UP (ref 0–0)
PLATELET # BLD AUTO: 56 K/UL — LOW (ref 150–400)
POTASSIUM SERPL-MCNC: 3.6 MMOL/L — SIGNIFICANT CHANGE UP (ref 3.5–5.3)
POTASSIUM SERPL-SCNC: 3.6 MMOL/L — SIGNIFICANT CHANGE UP (ref 3.5–5.3)
RBC # BLD: 4.92 M/UL — SIGNIFICANT CHANGE UP (ref 4.2–5.8)
RBC # FLD: 13.8 % — SIGNIFICANT CHANGE UP (ref 10.3–14.5)
SARS-COV-2 RNA SPEC QL NAA+PROBE: SIGNIFICANT CHANGE UP
SODIUM SERPL-SCNC: 140 MMOL/L — SIGNIFICANT CHANGE UP (ref 135–145)
WBC # BLD: 12.62 K/UL — HIGH (ref 3.8–10.5)
WBC # FLD AUTO: 12.62 K/UL — HIGH (ref 3.8–10.5)

## 2022-07-27 PROCEDURE — 71046 X-RAY EXAM CHEST 2 VIEWS: CPT

## 2022-07-27 PROCEDURE — 71046 X-RAY EXAM CHEST 2 VIEWS: CPT | Mod: 26

## 2022-07-27 PROCEDURE — 96375 TX/PRO/DX INJ NEW DRUG ADDON: CPT

## 2022-07-27 PROCEDURE — 36415 COLL VENOUS BLD VENIPUNCTURE: CPT

## 2022-07-27 PROCEDURE — 85025 COMPLETE CBC W/AUTO DIFF WBC: CPT

## 2022-07-27 PROCEDURE — 80048 BASIC METABOLIC PNL TOTAL CA: CPT

## 2022-07-27 PROCEDURE — 96361 HYDRATE IV INFUSION ADD-ON: CPT

## 2022-07-27 PROCEDURE — 87635 SARS-COV-2 COVID-19 AMP PRB: CPT

## 2022-07-27 PROCEDURE — 99285 EMERGENCY DEPT VISIT HI MDM: CPT | Mod: 25

## 2022-07-27 PROCEDURE — 99284 EMERGENCY DEPT VISIT MOD MDM: CPT

## 2022-07-27 PROCEDURE — 96374 THER/PROPH/DIAG INJ IV PUSH: CPT

## 2022-07-27 RX ORDER — AMOXICILLIN 250 MG/5ML
2 SUSPENSION, RECONSTITUTED, ORAL (ML) ORAL
Qty: 36 | Refills: 0
Start: 2022-07-27 | End: 2022-08-01

## 2022-07-27 RX ORDER — METOCLOPRAMIDE HCL 10 MG
10 TABLET ORAL ONCE
Refills: 0 | Status: COMPLETED | OUTPATIENT
Start: 2022-07-27 | End: 2022-07-27

## 2022-07-27 RX ORDER — ACETAMINOPHEN 500 MG
1000 TABLET ORAL ONCE
Refills: 0 | Status: COMPLETED | OUTPATIENT
Start: 2022-07-27 | End: 2022-07-27

## 2022-07-27 RX ORDER — AZITHROMYCIN 500 MG/1
1 TABLET, FILM COATED ORAL
Qty: 4 | Refills: 0
Start: 2022-07-27 | End: 2022-07-30

## 2022-07-27 RX ORDER — AZITHROMYCIN 500 MG/1
500 TABLET, FILM COATED ORAL ONCE
Refills: 0 | Status: COMPLETED | OUTPATIENT
Start: 2022-07-27 | End: 2022-07-27

## 2022-07-27 RX ORDER — SODIUM CHLORIDE 9 MG/ML
1000 INJECTION INTRAMUSCULAR; INTRAVENOUS; SUBCUTANEOUS ONCE
Refills: 0 | Status: COMPLETED | OUTPATIENT
Start: 2022-07-27 | End: 2022-07-27

## 2022-07-27 RX ORDER — AMOXICILLIN 250 MG/5ML
1000 SUSPENSION, RECONSTITUTED, ORAL (ML) ORAL ONCE
Refills: 0 | Status: COMPLETED | OUTPATIENT
Start: 2022-07-27 | End: 2022-07-27

## 2022-07-27 RX ADMIN — Medication 400 MILLIGRAM(S): at 19:26

## 2022-07-27 RX ADMIN — Medication 1000 MILLIGRAM(S): at 19:26

## 2022-07-27 RX ADMIN — SODIUM CHLORIDE 1000 MILLILITER(S): 9 INJECTION INTRAMUSCULAR; INTRAVENOUS; SUBCUTANEOUS at 21:28

## 2022-07-27 RX ADMIN — Medication 10 MILLIGRAM(S): at 19:26

## 2022-07-27 RX ADMIN — Medication 1000 MILLIGRAM(S): at 21:47

## 2022-07-27 RX ADMIN — AZITHROMYCIN 500 MILLIGRAM(S): 500 TABLET, FILM COATED ORAL at 21:47

## 2022-07-27 RX ADMIN — SODIUM CHLORIDE 1000 MILLILITER(S): 9 INJECTION INTRAMUSCULAR; INTRAVENOUS; SUBCUTANEOUS at 19:25

## 2022-07-27 RX ADMIN — Medication 1000 MILLIGRAM(S): at 19:56

## 2022-07-27 NOTE — ED ADULT TRIAGE NOTE - INTERNATIONAL TRAVEL
GFR 17, followed by Nephrology, plan for dialysis  · Being considered for dialysis  · Injecting 20U daily   No

## 2022-07-27 NOTE — ED PROVIDER NOTE - NSICDXPASTMEDICALHX_GEN_ALL_CORE_FT
PAST MEDICAL HISTORY:  Colon cancer     COVID-19 March 2020    HLD (hyperlipidemia)     HTN (hypertension)     Thrombocytopenia

## 2022-07-27 NOTE — ED PROVIDER NOTE - PATIENT PORTAL LINK FT
You can access the FollowMyHealth Patient Portal offered by [facility] by registering at the following website: [email or web address]. By joining [employer]’s FollowMyHealth portal, you will also be able to view your health information using other applications (apps) compatible with our system.

## 2022-07-27 NOTE — ED PROVIDER NOTE - PROGRESS NOTE DETAILS
XR shows LLL pneumonia. CURB-65 0. Very well-appearing, symptoms resolved. Will treat with Amox and Z-pack. Will need to follow up with PMD within 2-3 days. WIll also need to communicate with hemato the plat level tomorrow. Pt is well appearing walking with steady gait, stable for discharge and follow up without fail with medical doctor. I had a detailed discussion with the patient and/or guardian regarding the historical points, exam findings, and any diagnostic results supporting the discharge diagnosis. Pt educated on care and need for follow up. Strict return instructions and red flag signs and symptoms discussed with patient. Questions answered. Pt shows understanding of discharge information and agrees to follow.

## 2022-07-27 NOTE — ED PROVIDER NOTE - OBJECTIVE STATEMENT
57 year-old male, history of colon ca (in remission), thrombocytopenia (on Promacta), appendectomy, presents with 5 day history of intermittent fever (max 102 F), generalized headache, cough, generalized body aches. When symptoms first started did home test for covid and it was negative. has been taking Tylenol and IBU without relief. Body aches and malaise improving but still having fever, headache and feeling neck tightness. reports last plat count was ~ 120. No other complaints.

## 2022-07-27 NOTE — ED PROVIDER NOTE - NEUROLOGICAL, MLM
Alert and oriented, no focal deficits, no motor or sensory deficits. Neck supple, full range of motion. No rigidity.

## 2022-07-27 NOTE — ED PROVIDER NOTE - NSFOLLOWUPINSTRUCTIONS_ED_ALL_ED_FT
Follow up with the primary care doctor within 2-3 days.  Inform your hematologist tomorrow about your platelet levels.  If you experience any new or worsening symptoms or if you are concerned you can always come back to the emergency for a re-evaluation.  If there were any prescriptions given to you during the visit today take them as prescribed. If you have any questions you can ask the pharmacist.

## 2022-07-27 NOTE — ED PROVIDER NOTE - CLINICAL SUMMARY MEDICAL DECISION MAKING FREE TEXT BOX
57 year-old male, presents with fever, cough, headache, body aches that started 5 days ago. Non-toxic appearance, vitals stable, afebrile. No signs of meningeal irritation. History and exam suggestive of likely viral etiology like covid. Will do cbc to assess for plat count, pain meds, IVF, re-assess.

## 2023-09-06 NOTE — DISCHARGE NOTE PROVIDER - PROVIDER RX CONTACT NUMBER
[phone] Dapsone Counseling: I discussed with the patient the risks of dapsone including but not limited to hemolytic anemia, agranulocytosis, rashes, methemoglobinemia, kidney failure, peripheral neuropathy, headaches, GI upset, and liver toxicity.  Patients who start dapsone require monitoring including baseline LFTs and weekly CBCs for the first month, then every month thereafter.  The patient verbalized understanding of the proper use and possible adverse effects of dapsone.  All of the patient's questions and concerns were addressed.

## 2023-10-27 NOTE — PATIENT PROFILE ADULT - TRANSPORTATION
Problem: Adult Inpatient Plan of Care  Goal: Plan of Care Review  Outcome: Ongoing, Progressing  Flowsheets (Taken 10/27/2023 1444)  Plan of Care Reviewed With: patient  Goal: Patient-Specific Goal (Individualized)  Outcome: Ongoing, Progressing  Flowsheets (Taken 10/27/2023 1444)  Anxieties, Fears or Concerns: Pt is adamant that she get fluids with her iron  Individualized Care Needs: Pt in recliner, blanket and OJ provided  Goal: Absence of Hospital-Acquired Illness or Injury  Outcome: Ongoing, Progressing  Goal: Optimal Comfort and Wellbeing  Outcome: Ongoing, Progressing     Problem: Anemia  Goal: Anemia Symptom Improvement  Outcome: Ongoing, Progressing      no

## 2023-12-18 NOTE — ED ADULT NURSE NOTE - TEMPLATE LIST FOR HEAD TO TOE ASSESSMENT
Instructions: This plan will send the code FBSE to the PM system.  DO NOT or CHANGE the price. Detail Level: Simple Price (Do Not Change): 0.00 General

## 2025-04-28 ENCOUNTER — EMERGENCY (EMERGENCY)
Facility: HOSPITAL | Age: 60
LOS: 1 days | End: 2025-04-28
Attending: STUDENT IN AN ORGANIZED HEALTH CARE EDUCATION/TRAINING PROGRAM
Payer: SELF-PAY

## 2025-04-28 VITALS
DIASTOLIC BLOOD PRESSURE: 78 MMHG | TEMPERATURE: 98 F | HEIGHT: 69 IN | RESPIRATION RATE: 18 BRPM | WEIGHT: 263.89 LBS | OXYGEN SATURATION: 95 % | SYSTOLIC BLOOD PRESSURE: 161 MMHG | HEART RATE: 61 BPM

## 2025-04-28 VITALS
OXYGEN SATURATION: 100 % | DIASTOLIC BLOOD PRESSURE: 69 MMHG | TEMPERATURE: 98 F | SYSTOLIC BLOOD PRESSURE: 124 MMHG | RESPIRATION RATE: 18 BRPM | HEART RATE: 62 BPM

## 2025-04-28 PROBLEM — C18.9 MALIGNANT NEOPLASM OF COLON, UNSPECIFIED: Chronic | Status: ACTIVE | Noted: 2022-07-27

## 2025-04-28 PROBLEM — D69.6 THROMBOCYTOPENIA, UNSPECIFIED: Chronic | Status: ACTIVE | Noted: 2022-07-27

## 2025-04-28 LAB
ALBUMIN SERPL ELPH-MCNC: 4 G/DL — SIGNIFICANT CHANGE UP (ref 3.5–5)
ALP SERPL-CCNC: 108 U/L — SIGNIFICANT CHANGE UP (ref 40–120)
ALT FLD-CCNC: 48 U/L DA — SIGNIFICANT CHANGE UP (ref 10–60)
ANION GAP SERPL CALC-SCNC: 6 MMOL/L — SIGNIFICANT CHANGE UP (ref 5–17)
APTT BLD: 38.8 SEC — HIGH (ref 26.1–36.8)
AST SERPL-CCNC: 42 U/L — HIGH (ref 10–40)
BASOPHILS # BLD AUTO: 0.12 K/UL — SIGNIFICANT CHANGE UP (ref 0–0.2)
BASOPHILS NFR BLD AUTO: 1.1 % — SIGNIFICANT CHANGE UP (ref 0–2)
BILIRUB SERPL-MCNC: 1.3 MG/DL — HIGH (ref 0.2–1.2)
BUN SERPL-MCNC: 12 MG/DL — SIGNIFICANT CHANGE UP (ref 7–18)
CALCIUM SERPL-MCNC: 8.5 MG/DL — SIGNIFICANT CHANGE UP (ref 8.4–10.5)
CHLORIDE SERPL-SCNC: 106 MMOL/L — SIGNIFICANT CHANGE UP (ref 96–108)
CO2 SERPL-SCNC: 24 MMOL/L — SIGNIFICANT CHANGE UP (ref 22–31)
CREAT SERPL-MCNC: 0.92 MG/DL — SIGNIFICANT CHANGE UP (ref 0.5–1.3)
D DIMER BLD IA.RAPID-MCNC: 176 NG/ML DDU — SIGNIFICANT CHANGE UP
EGFR: 95 ML/MIN/1.73M2 — SIGNIFICANT CHANGE UP
EGFR: 95 ML/MIN/1.73M2 — SIGNIFICANT CHANGE UP
EOSINOPHIL # BLD AUTO: 0.58 K/UL — HIGH (ref 0–0.5)
EOSINOPHIL NFR BLD AUTO: 5.1 % — SIGNIFICANT CHANGE UP (ref 0–6)
FLUAV AG NPH QL: SIGNIFICANT CHANGE UP
FLUBV AG NPH QL: SIGNIFICANT CHANGE UP
GLUCOSE SERPL-MCNC: 93 MG/DL — SIGNIFICANT CHANGE UP (ref 70–99)
HCT VFR BLD CALC: 46.3 % — SIGNIFICANT CHANGE UP (ref 39–50)
HGB BLD-MCNC: 15.7 G/DL — SIGNIFICANT CHANGE UP (ref 13–17)
IMM GRANULOCYTES NFR BLD AUTO: 0.8 % — SIGNIFICANT CHANGE UP (ref 0–0.9)
INR BLD: 1.06 RATIO — SIGNIFICANT CHANGE UP (ref 0.85–1.16)
LIDOCAIN IGE QN: 30 U/L — SIGNIFICANT CHANGE UP (ref 13–75)
LYMPHOCYTES # BLD AUTO: 26.6 % — SIGNIFICANT CHANGE UP (ref 13–44)
LYMPHOCYTES # BLD AUTO: 3.01 K/UL — SIGNIFICANT CHANGE UP (ref 1–3.3)
MAGNESIUM SERPL-MCNC: 2.3 MG/DL — SIGNIFICANT CHANGE UP (ref 1.6–2.6)
MCHC RBC-ENTMCNC: 29.1 PG — SIGNIFICANT CHANGE UP (ref 27–34)
MCHC RBC-ENTMCNC: 33.9 G/DL — SIGNIFICANT CHANGE UP (ref 32–36)
MCV RBC AUTO: 85.9 FL — SIGNIFICANT CHANGE UP (ref 80–100)
MONOCYTES # BLD AUTO: 0.72 K/UL — SIGNIFICANT CHANGE UP (ref 0–0.9)
MONOCYTES NFR BLD AUTO: 6.4 % — SIGNIFICANT CHANGE UP (ref 2–14)
NEUTROPHILS # BLD AUTO: 6.81 K/UL — SIGNIFICANT CHANGE UP (ref 1.8–7.4)
NEUTROPHILS NFR BLD AUTO: 60 % — SIGNIFICANT CHANGE UP (ref 43–77)
NRBC BLD AUTO-RTO: 0 /100 WBCS — SIGNIFICANT CHANGE UP (ref 0–0)
NT-PROBNP SERPL-SCNC: 97 PG/ML — SIGNIFICANT CHANGE UP (ref 0–125)
PLATELET # BLD AUTO: 158 K/UL — SIGNIFICANT CHANGE UP (ref 150–400)
POTASSIUM SERPL-MCNC: 4.3 MMOL/L — SIGNIFICANT CHANGE UP (ref 3.5–5.3)
POTASSIUM SERPL-SCNC: 4.3 MMOL/L — SIGNIFICANT CHANGE UP (ref 3.5–5.3)
PROT SERPL-MCNC: 7.4 G/DL — SIGNIFICANT CHANGE UP (ref 6–8.3)
PROTHROM AB SERPL-ACNC: 12.4 SEC — SIGNIFICANT CHANGE UP (ref 9.9–13.4)
RBC # BLD: 5.39 M/UL — SIGNIFICANT CHANGE UP (ref 4.2–5.8)
RBC # FLD: 14.3 % — SIGNIFICANT CHANGE UP (ref 10.3–14.5)
RSV RNA NPH QL NAA+NON-PROBE: SIGNIFICANT CHANGE UP
SARS-COV-2 RNA SPEC QL NAA+PROBE: SIGNIFICANT CHANGE UP
SODIUM SERPL-SCNC: 136 MMOL/L — SIGNIFICANT CHANGE UP (ref 135–145)
SOURCE RESPIRATORY: SIGNIFICANT CHANGE UP
TROPONIN I, HIGH SENSITIVITY RESULT: 26.2 NG/L — SIGNIFICANT CHANGE UP
TROPONIN I, HIGH SENSITIVITY RESULT: 26.9 NG/L — SIGNIFICANT CHANGE UP
WBC # BLD: 11.33 K/UL — HIGH (ref 3.8–10.5)
WBC # FLD AUTO: 11.33 K/UL — HIGH (ref 3.8–10.5)

## 2025-04-28 PROCEDURE — 99285 EMERGENCY DEPT VISIT HI MDM: CPT

## 2025-04-28 PROCEDURE — 83690 ASSAY OF LIPASE: CPT

## 2025-04-28 PROCEDURE — 99285 EMERGENCY DEPT VISIT HI MDM: CPT | Mod: 25

## 2025-04-28 PROCEDURE — 83880 ASSAY OF NATRIURETIC PEPTIDE: CPT

## 2025-04-28 PROCEDURE — 85025 COMPLETE CBC W/AUTO DIFF WBC: CPT

## 2025-04-28 PROCEDURE — 93005 ELECTROCARDIOGRAM TRACING: CPT

## 2025-04-28 PROCEDURE — 80053 COMPREHEN METABOLIC PANEL: CPT

## 2025-04-28 PROCEDURE — 87637 SARSCOV2&INF A&B&RSV AMP PRB: CPT

## 2025-04-28 PROCEDURE — 93010 ELECTROCARDIOGRAM REPORT: CPT

## 2025-04-28 PROCEDURE — 71045 X-RAY EXAM CHEST 1 VIEW: CPT | Mod: 26

## 2025-04-28 PROCEDURE — 71045 X-RAY EXAM CHEST 1 VIEW: CPT

## 2025-04-28 PROCEDURE — 85379 FIBRIN DEGRADATION QUANT: CPT

## 2025-04-28 PROCEDURE — 96374 THER/PROPH/DIAG INJ IV PUSH: CPT

## 2025-04-28 PROCEDURE — 85610 PROTHROMBIN TIME: CPT

## 2025-04-28 PROCEDURE — 85730 THROMBOPLASTIN TIME PARTIAL: CPT

## 2025-04-28 PROCEDURE — 83735 ASSAY OF MAGNESIUM: CPT

## 2025-04-28 PROCEDURE — 84484 ASSAY OF TROPONIN QUANT: CPT

## 2025-04-28 PROCEDURE — 36415 COLL VENOUS BLD VENIPUNCTURE: CPT

## 2025-04-28 RX ORDER — MAGNESIUM, ALUMINUM HYDROXIDE 200-200 MG
30 TABLET,CHEWABLE ORAL ONCE
Refills: 0 | Status: COMPLETED | OUTPATIENT
Start: 2025-04-28 | End: 2025-04-28

## 2025-04-28 RX ADMIN — Medication 20 MILLIGRAM(S): at 12:06

## 2025-04-28 RX ADMIN — Medication 30 MILLILITER(S): at 12:06

## 2025-04-28 NOTE — ED PROVIDER NOTE - OBJECTIVE STATEMENT
Patient is a 59 yo male with PMHx HTN, HLD, colon CA s/p surgical resection in resection, thrombocytopenia on Promacta presenting with CP since yesterday. Patient states he has dull intermittent L sided non radiating CP Since yesterday.  Denies any fevers, cough, congestion, recent travel, sick contacts, peripheral edema, SOB, daily alcohol use, smoking, IV drug use.  Denies any cardiac history.

## 2025-04-28 NOTE — ED PROVIDER NOTE - CLINICAL SUMMARY MEDICAL DECISION MAKING FREE TEXT BOX
Patient is a 61 yo male with PMHx HTN, HLD, colon CA s/p surgical resection in resection, thrombocytopenia on Promacta presenting with CP since yesterday. VSS lungs ctab belly soft non tender no peripheral edema. No active CP.  - Will check labs r.o electrolyte abnormalities, d-dimer to r.o PE, cxr bnp viral swab to evaluate for HF vs pna vs ptx, ekg trop to assess for acs vs arrythmia, r.o pancreatitis, reassess.

## 2025-04-28 NOTE — ED ADULT NURSE NOTE - OBJECTIVE STATEMENT
axox3 ,nad , dizziness on and off x 2 weeks and chest pain x 2 days on and off , denies any chest pain at present time , No SOB  .

## 2025-04-28 NOTE — ED PROVIDER NOTE - NSDCPRINTRESULTS_ED_ALL_ED
Patient requests all Lab, Cardiology, and Radiology Results on their Discharge Instructions
None known

## 2025-04-28 NOTE — ED PROVIDER NOTE - PATIENT PORTAL LINK FT
You can access the FollowMyHealth Patient Portal offered by [facility] by registering at the following website: [email or web address]. By joining [email or web address]’s FollowMyHealth portal, you will also be able to view your health information using other applications (apps) compatible with our system.

## 2025-04-28 NOTE — ED PROVIDER NOTE - PRINCIPAL DIAGNOSIS
We are committed to providing our patients with their test results in a timely manner. If you have access to SendMe, you will receive your results within 5 to 7 days. If your results are normal, a member of our office may call you or you may receive a letter in the mail within 10 to 15 days of your testing. If your results have something additional to discuss, a member of our office will contact you by phone. If at any time you have questions related your results, please feel free to call our office at [phone]      Your Opinion Matters To Us!  You may receive a survey via mail or e-mail and we would appreciate your feedback. Our organization uses this information to assess our performance as we strive to provide exceptional care. We truly value and appreciate your feedback. Thank you in advance for taking the time to do this. It was a pleasure to take care of you today.    Sore throat   Chloraseptic spray or Cepachol lozenges   Gargle Salt Water at least 3-4 times per day  Ibuprofen 200 m-3 pills up to every 8 hours with food and you can alternate with tylenol as needed     Body Aches/ Fevers   Ibuprofen/Tylenol   Hot packs to sore muscles   BenGay Cream/ Aspercream (no odor) to sore muscles    Headache   Ibuprofen/Tylenol   Ice pack/ Heat to forehead    Nasal drainage/Congestion   Mucinex   Hot steam showers in the morning and at night  Nasal saline spray: 1-2 sprays in each nostril, every 3-4 hours as needed to loosen mucous     Cough   Teaspoon of honey 2-3 times a day, especially before sleeping  Any over the counter cough syrup if unable to sleep at night due to coughing    -----------------------  Wash hands  Get more sleep  Vit D and Vit C supplement for immunity boost  Yearly flu vaccine     Chest pain

## 2025-04-28 NOTE — ED PROVIDER NOTE - CARE PROVIDER_API CALL
Mark Duvall  Cardiovascular Disease  [address], [facility] [address]  [place] [address]  Phone: [phone]  Fax: [phone]  Follow Up Time: 7-10 Days

## 2025-04-28 NOTE — ED ADULT NURSE NOTE - CHIEF COMPLAINT QUOTE
-Admitted to inpatient status  -Baseline Hb near 12  -On admit Hb 9.6 and reported melena  -GI consulted and patient going taken for EGD 7/5 which showed no source of bleeding.  Specifically: a bleb found in the esophagus, tortuous esophagus, 6 cm hiatal hernia, normal stomach, normal examined duodenum.  -Given risk of aspiration with prep - will hold off on colonoscopy unless active bleeding or dropping H/H.  -Holding home aspirin  -Monitor H/H - stable .  Transfuse for Hb less than 7.  She is iron deficient - Ts[place] 7%  -Hb 10.4-->9.7 today  -Continue to daily PPI and oral iron supplement   -Noted isolated fever - addressed below  -Pending d/c to SNF   chest pain started yesterday

## 2025-07-08 NOTE — H&P ADULT - NSICDXPASTSURGICALHX_GEN_ALL_CORE_FT
Bed/Stretcher in lowest position, wheels locked, appropriate side rails in place/Call bell, personal items and telephone in reach/Instruct patient to call for assistance before getting out of bed/chair/stretcher/Non-slip footwear applied when patient is off stretcher/[place] to call system/Physically safe environment - no spills, clutter or unnecessary equipment/Purposeful proactive rounding/Room/bathroom lighting operational, light cord in reach
PAST SURGICAL HISTORY:  No significant past surgical history